# Patient Record
Sex: FEMALE | Race: WHITE | NOT HISPANIC OR LATINO | Employment: OTHER | URBAN - METROPOLITAN AREA
[De-identification: names, ages, dates, MRNs, and addresses within clinical notes are randomized per-mention and may not be internally consistent; named-entity substitution may affect disease eponyms.]

---

## 2023-10-10 ENCOUNTER — HOSPITAL ENCOUNTER (OUTPATIENT)
Facility: CLINIC | Age: 73
Setting detail: OBSERVATION
Discharge: HOME | End: 2023-10-11
Attending: EMERGENCY MEDICINE | Admitting: STUDENT IN AN ORGANIZED HEALTH CARE EDUCATION/TRAINING PROGRAM
Payer: COMMERCIAL

## 2023-10-10 ENCOUNTER — APPOINTMENT (OUTPATIENT)
Dept: GENERAL RADIOLOGY | Facility: CLINIC | Age: 73
End: 2023-10-10
Attending: EMERGENCY MEDICINE
Payer: COMMERCIAL

## 2023-10-10 ENCOUNTER — APPOINTMENT (OUTPATIENT)
Dept: CT IMAGING | Facility: CLINIC | Age: 73
End: 2023-10-10
Attending: EMERGENCY MEDICINE
Payer: COMMERCIAL

## 2023-10-10 DIAGNOSIS — I16.0 HYPERTENSIVE URGENCY: ICD-10-CM

## 2023-10-10 LAB
ALBUMIN SERPL BCG-MCNC: 4.4 G/DL (ref 3.5–5.2)
ALP SERPL-CCNC: 87 U/L (ref 35–104)
ALT SERPL W P-5'-P-CCNC: 22 U/L (ref 0–50)
ANION GAP SERPL CALCULATED.3IONS-SCNC: 13 MMOL/L (ref 7–15)
AST SERPL W P-5'-P-CCNC: 17 U/L (ref 0–45)
ATRIAL RATE - MUSE: 89 BPM
BASO+EOS+MONOS # BLD AUTO: NORMAL 10*3/UL
BASO+EOS+MONOS NFR BLD AUTO: NORMAL %
BASOPHILS # BLD AUTO: 0.1 10E3/UL (ref 0–0.2)
BASOPHILS NFR BLD AUTO: 1 %
BILIRUB SERPL-MCNC: 0.4 MG/DL
BUN SERPL-MCNC: 13.2 MG/DL (ref 8–23)
CALCIUM SERPL-MCNC: 9.5 MG/DL (ref 8.8–10.2)
CHLORIDE SERPL-SCNC: 103 MMOL/L (ref 98–107)
CREAT SERPL-MCNC: 0.58 MG/DL (ref 0.51–0.95)
D DIMER PPP FEU-MCNC: <0.27 UG/ML FEU (ref 0–0.5)
DEPRECATED HCO3 PLAS-SCNC: 23 MMOL/L (ref 22–29)
DIASTOLIC BLOOD PRESSURE - MUSE: NORMAL MMHG
EGFRCR SERPLBLD CKD-EPI 2021: >90 ML/MIN/1.73M2
EOSINOPHIL # BLD AUTO: 0.3 10E3/UL (ref 0–0.7)
EOSINOPHIL NFR BLD AUTO: 3 %
ERYTHROCYTE [DISTWIDTH] IN BLOOD BY AUTOMATED COUNT: 12.6 % (ref 10–15)
GLUCOSE SERPL-MCNC: 101 MG/DL (ref 70–99)
HCT VFR BLD AUTO: 42.8 % (ref 35–47)
HGB BLD-MCNC: 13.8 G/DL (ref 11.7–15.7)
HOLD SPECIMEN: 0
HOLD SPECIMEN: NORMAL
IMM GRANULOCYTES # BLD: 0 10E3/UL
IMM GRANULOCYTES NFR BLD: 0 %
INTERPRETATION ECG - MUSE: NORMAL
LYMPHOCYTES # BLD AUTO: 2.4 10E3/UL (ref 0.8–5.3)
LYMPHOCYTES NFR BLD AUTO: 27 %
MCH RBC QN AUTO: 30 PG (ref 26.5–33)
MCHC RBC AUTO-ENTMCNC: 32.2 G/DL (ref 31.5–36.5)
MCV RBC AUTO: 93 FL (ref 78–100)
MONOCYTES # BLD AUTO: 0.5 10E3/UL (ref 0–1.3)
MONOCYTES NFR BLD AUTO: 6 %
NEUTROPHILS # BLD AUTO: 5.6 10E3/UL (ref 1.6–8.3)
NEUTROPHILS NFR BLD AUTO: 63 %
NRBC # BLD AUTO: 0 10E3/UL
NRBC BLD AUTO-RTO: 0 /100
NT-PROBNP SERPL-MCNC: 40 PG/ML (ref 0–900)
P AXIS - MUSE: 55 DEGREES
PLATELET # BLD AUTO: 254 10E3/UL (ref 150–450)
POTASSIUM SERPL-SCNC: 3.9 MMOL/L (ref 3.4–5.3)
PR INTERVAL - MUSE: 170 MS
PROT SERPL-MCNC: 7.7 G/DL (ref 6.4–8.3)
QRS DURATION - MUSE: 76 MS
QT - MUSE: 338 MS
QTC - MUSE: 411 MS
R AXIS - MUSE: 27 DEGREES
RBC # BLD AUTO: 4.6 10E6/UL (ref 3.8–5.2)
SODIUM SERPL-SCNC: 139 MMOL/L (ref 135–145)
SYSTOLIC BLOOD PRESSURE - MUSE: NORMAL MMHG
T AXIS - MUSE: 22 DEGREES
TROPONIN T SERPL HS-MCNC: 6 NG/L
TROPONIN T SERPL HS-MCNC: 7 NG/L
TSH SERPL DL<=0.005 MIU/L-ACNC: 2.4 UIU/ML (ref 0.3–4.2)
VENTRICULAR RATE- MUSE: 89 BPM
WBC # BLD AUTO: 8.9 10E3/UL (ref 4–11)

## 2023-10-10 PROCEDURE — 84443 ASSAY THYROID STIM HORMONE: CPT | Performed by: EMERGENCY MEDICINE

## 2023-10-10 PROCEDURE — 250N000011 HC RX IP 250 OP 636: Mod: JZ | Performed by: EMERGENCY MEDICINE

## 2023-10-10 PROCEDURE — 71046 X-RAY EXAM CHEST 2 VIEWS: CPT

## 2023-10-10 PROCEDURE — 96361 HYDRATE IV INFUSION ADD-ON: CPT

## 2023-10-10 PROCEDURE — 36415 COLL VENOUS BLD VENIPUNCTURE: CPT | Performed by: EMERGENCY MEDICINE

## 2023-10-10 PROCEDURE — 99285 EMERGENCY DEPT VISIT HI MDM: CPT | Mod: 25

## 2023-10-10 PROCEDURE — 99222 1ST HOSP IP/OBS MODERATE 55: CPT | Mod: AI | Performed by: STUDENT IN AN ORGANIZED HEALTH CARE EDUCATION/TRAINING PROGRAM

## 2023-10-10 PROCEDURE — 85379 FIBRIN DEGRADATION QUANT: CPT | Performed by: EMERGENCY MEDICINE

## 2023-10-10 PROCEDURE — 70450 CT HEAD/BRAIN W/O DYE: CPT

## 2023-10-10 PROCEDURE — 84484 ASSAY OF TROPONIN QUANT: CPT | Performed by: EMERGENCY MEDICINE

## 2023-10-10 PROCEDURE — 93005 ELECTROCARDIOGRAM TRACING: CPT

## 2023-10-10 PROCEDURE — G0378 HOSPITAL OBSERVATION PER HR: HCPCS

## 2023-10-10 PROCEDURE — 82310 ASSAY OF CALCIUM: CPT | Performed by: EMERGENCY MEDICINE

## 2023-10-10 PROCEDURE — 84484 ASSAY OF TROPONIN QUANT: CPT | Mod: 91 | Performed by: EMERGENCY MEDICINE

## 2023-10-10 PROCEDURE — 86618 LYME DISEASE ANTIBODY: CPT | Performed by: EMERGENCY MEDICINE

## 2023-10-10 PROCEDURE — 96374 THER/PROPH/DIAG INJ IV PUSH: CPT

## 2023-10-10 PROCEDURE — 83880 ASSAY OF NATRIURETIC PEPTIDE: CPT | Performed by: EMERGENCY MEDICINE

## 2023-10-10 PROCEDURE — 96376 TX/PRO/DX INJ SAME DRUG ADON: CPT

## 2023-10-10 PROCEDURE — 250N000013 HC RX MED GY IP 250 OP 250 PS 637: Performed by: STUDENT IN AN ORGANIZED HEALTH CARE EDUCATION/TRAINING PROGRAM

## 2023-10-10 PROCEDURE — 85025 COMPLETE CBC W/AUTO DIFF WBC: CPT | Performed by: EMERGENCY MEDICINE

## 2023-10-10 PROCEDURE — 258N000003 HC RX IP 258 OP 636: Performed by: EMERGENCY MEDICINE

## 2023-10-10 RX ORDER — MULTIVIT WITH MINERALS/LUTEIN
2000 TABLET ORAL DAILY
COMMUNITY

## 2023-10-10 RX ORDER — FLUTICASONE PROPIONATE AND SALMETEROL 100; 50 UG/1; UG/1
1 POWDER RESPIRATORY (INHALATION) EVERY 12 HOURS
COMMUNITY

## 2023-10-10 RX ORDER — ONDANSETRON 2 MG/ML
4 INJECTION INTRAMUSCULAR; INTRAVENOUS EVERY 6 HOURS PRN
Status: DISCONTINUED | OUTPATIENT
Start: 2023-10-10 | End: 2023-10-11 | Stop reason: HOSPADM

## 2023-10-10 RX ORDER — LANOLIN ALCOHOL/MO/W.PET/CERES
3 CREAM (GRAM) TOPICAL
Status: DISCONTINUED | OUTPATIENT
Start: 2023-10-10 | End: 2023-10-11 | Stop reason: HOSPADM

## 2023-10-10 RX ORDER — ONDANSETRON 4 MG/1
4 TABLET, ORALLY DISINTEGRATING ORAL EVERY 6 HOURS PRN
Status: DISCONTINUED | OUTPATIENT
Start: 2023-10-10 | End: 2023-10-11 | Stop reason: HOSPADM

## 2023-10-10 RX ORDER — HYDRALAZINE HYDROCHLORIDE 20 MG/ML
10 INJECTION INTRAMUSCULAR; INTRAVENOUS ONCE
Status: COMPLETED | OUTPATIENT
Start: 2023-10-10 | End: 2023-10-10

## 2023-10-10 RX ORDER — AMLODIPINE BESYLATE 5 MG/1
5 TABLET ORAL DAILY
Status: DISCONTINUED | OUTPATIENT
Start: 2023-10-10 | End: 2023-10-11

## 2023-10-10 RX ADMIN — SODIUM CHLORIDE 1000 ML: 9 INJECTION, SOLUTION INTRAVENOUS at 17:19

## 2023-10-10 RX ADMIN — AMLODIPINE BESYLATE 5 MG: 5 TABLET ORAL at 22:16

## 2023-10-10 RX ADMIN — HYDRALAZINE HYDROCHLORIDE 10 MG: 20 INJECTION INTRAMUSCULAR; INTRAVENOUS at 20:43

## 2023-10-10 RX ADMIN — HYDRALAZINE HYDROCHLORIDE 10 MG: 20 INJECTION INTRAMUSCULAR; INTRAVENOUS at 20:02

## 2023-10-10 RX ADMIN — Medication 3 MG: at 22:16

## 2023-10-10 ASSESSMENT — ACTIVITIES OF DAILY LIVING (ADL)
ADLS_ACUITY_SCORE: 35

## 2023-10-10 NOTE — ED PROVIDER NOTES
"  History     Chief Complaint:  Lightheadedness, head throbbing, dizziness     HPI   Alessandra Barajas is a 73 year old female with a history of asthma, hyperlipidemia, osteopenia, and migraine who presents with dizziness, mild throbbing in the head, lightheadedness, and the feeling of passing out which started today when she bent over trying to pick something up. She went to the hair salon and the fumes there seemed to make her symptoms worse. She states she also experienced months of shortness of breath and fatigue since her sinus infection on 9/1 which she was prescribed 2 antibiotics for. During and after taking her medication, she reports central chest pain. In the last month, she has been experiencing low energy and sleeping 11 hours a day. She went to Urgent Care in [place] where she had a normal x-ray. Currently, she just feels tired and a burning, throbbing sensation in the head. Denies room spinning. Reports recent vaginal yeast infection. Denies history of lyme disease or thyroid issues.    Independent Historian:   None - Patient Only    Review of External Notes:   9/30/2023 office visit for acute sinusitis at [facility] urgent care     Medications:    Voltaren   Albuterol   Wixela inhub   Methylprednisolone   Flexeril   Augmentin     Past Medical History:    Asthma   Allergic rhinitis   Benign neoplasm of colon   Osteopenia   Chronic constipation   Migraine   Hyperlipidemia     Past Surgical History:    Colonoscopy   Mastectomy   Appendectomy   Sinus surgery   Mastoidectomy   Breast biopsy    Physical Exam      Physical Exam    Patient Vitals for the past 24 hrs:   BP Temp Pulse Resp SpO2 Height Weight   10/10/23 1800 (!) 194/95 -- 80 14 98 % -- --   10/10/23 1720 (!) 185/107 -- 85 30 97 % -- --   10/10/23 1700 (!) 206/120 -- 87 -- 100 % -- --   10/10/23 1319 (!) 221/105 97.1  F (36.2  C) 99 14 97 % 1.676 m (5' 6\") 88.5 kg (195 lb)     General: Alert and Interactive.   Head: No signs of " trauma.   Mouth/Throat: Oropharynx is clear and moist.   Eyes: Conjunctivae are normal. Pupils are equal, round, and reactive to light.   Neck: Normal range of motion. No nuchal rigidity.   CV: Normal rate and regular rhythm.    Resp: Effort normal and breath sounds normal. No respiratory distress.   GI: Soft. There is no tenderness or guarding.   MSK: Normal range of motion. no edema.   Neuro: The patient is alert and oriented to person, place, and time.  PERRLA, EOMI, strength in upper/lower extremities normal and symmetrical.   Sensation normal. Speech normal.  GCS eye subscore is 4. GCS verbal subscore is 5. GCS motor subscore is 6.   Skin: Skin is warm and dry. No rash noted.   Psych: normal mood and affect. behavior is normal.      Emergency Department Course   ECG  ECG results from 10/10/23   EKG 12-lead, tracing only     Value    Systolic Blood Pressure     Diastolic Blood Pressure     Ventricular Rate 89    Atrial Rate 89    VA Interval 170    QRS Duration 76        QTc 411    P Axis 55    R AXIS 27    T Axis 22    Interpretation ECG      Sinus rhythm  Possible Left atrial enlargement  Septal infarct , age undetermined  Abnormal ECG  No previous ECGs available  Taken 1337, Read 1800      Imaging:  Head CT w/o contrast   Final Result   IMPRESSION:   1.  No acute transcortical infarct, intracranial hemorrhage, or mass effect.            XR Chest 2 Views   Final Result   IMPRESSION: Heart size and pulmonary vascularity normal. Linear strand of fibrosis or atelectasis left base, lungs otherwise clear. A few gas-distended bowel loops left upper quadrant.         Laboratory:  Labs Ordered and Resulted from Time of ED Arrival to Time of ED Departure   COMPREHENSIVE METABOLIC PANEL - Abnormal       Result Value    Sodium 139      Potassium 3.9      Carbon Dioxide (CO2) 23      Anion Gap 13      Urea Nitrogen 13.2      Creatinine 0.58      GFR Estimate >90      Calcium 9.5      Chloride 103      Glucose 101  (*)     Alkaline Phosphatase 87      AST 17      ALT 22      Protein Total 7.7      Albumin 4.4      Bilirubin Total 0.4     D DIMER QUANTITATIVE - Normal    D-Dimer Quantitative <0.27     TROPONIN T, HIGH SENSITIVITY - Normal    Troponin T, High Sensitivity 6     NT PROBNP INPATIENT - Normal    N terminal Pro BNP Inpatient 40     TROPONIN T, HIGH SENSITIVITY - Normal    Troponin T, High Sensitivity 7     TSH WITH FREE T4 REFLEX - Normal    TSH 2.40     CBC WITH PLATELETS AND DIFFERENTIAL    WBC Count 8.9      RBC Count 4.60      Hemoglobin 13.8      Hematocrit 42.8      MCV 93      MCH 30.0      MCHC 32.2      RDW 12.6      Platelet Count 254      % Neutrophils 63      % Lymphocytes 27      % Monocytes 6      Mids % (Monos, Eos, Basos)        % Eosinophils 3      % Basophils 1      % Immature Granulocytes 0      NRBCs per 100 WBC 0      Absolute Neutrophils 5.6      Absolute Lymphocytes 2.4      Absolute Monocytes 0.5      Mids Abs (Monos, Eos, Basos)        Absolute Eosinophils 0.3      Absolute Basophils 0.1      Absolute Immature Granulocytes 0.0      Absolute NRBCs 0.0     LYME DISEASE TOTAL ABS BLD WITH REFLEX TO CONFIRM CLIA        Emergency Department Course & Assessments:    Interventions:  Medications   melatonin tablet 3 mg (3 mg Oral $Given 10/10/23 2216)   ondansetron (ZOFRAN ODT) ODT tab 4 mg (has no administration in time range)     Or   ondansetron (ZOFRAN) injection 4 mg (has no administration in time range)   sodium chloride 0.9 % infusion ( Intravenous $New Bag 10/11/23 0038)   amLODIPine (NORVASC) tablet 2.5 mg (has no administration in time range)   sodium chloride 0.9% BOLUS 500 mL (500 mLs Intravenous $New Bag 10/11/23 0035)   fluticasone (FLONASE) 50 MCG/ACT spray 1 spray (has no administration in time range)   sodium chloride 0.9% BOLUS 1,000 mL (0 mLs Intravenous Stopped 10/10/23 1835)   hydrALAZINE (APRESOLINE) injection 10 mg (10 mg Intravenous $Given 10/10/23 2002)   hydrALAZINE  "(APRESOLINE) injection 10 mg (10 mg Intravenous $Given 10/10/23 2043)        Assessments:  1726 I obtained the history and examined the patient as detailed above.   2033 I rechecked patient. Blood pressure is still elevated, so she will be admitted for hypertensive emergency.    Independent Interpretation (X-rays, CTs, rhythm strip):  Chest x-ray shows no pneumothorax    Consultations/Discussion of Management or Tests:  2117 I spoke with the hospitalist, Dr. Medeiros, about patient admission.   2138 I spoke with the hospitalist, Dr. Medeiros, about patient presentation.     Social Determinants of Health affecting care:   Stress/Adjustment Disorders    Disposition:  The patient was admitted to the hospital under the care of Dr. Medeiros.     Impression & Plan      Medical Decision Making:  Alessandra Barajas is a 73 year old female who presents for evaluation of elevated blood pressure and multiple additional symptoms including lightheadedness and a \"funny feeling in her head\".  There is history of hypertension in the past.  The workup here is negative and the patient does not have any clinical, laboratory, ecg or historical signs of end-organ dysfunction, except for the ongoing headache.  This is consistent with hypertensive urgency.    The patient will be admitted to the hospital for further evaluation and treatment    Diagnosis:    ICD-10-CM    1. Hypertensive urgency  I16.0               Scribe Disclosure:  I, Matti Ana Rosa, am serving as a scribe at 8:05 PM on 10/10/2023 to document services personally performed by Masood Coats MD based on my observations and the provider's statements to me.   10/10/2023   Masood Coats MD Joing, Todd Roger, MD  10/11/23 0048       Masood Coats MD  10/11/23 0049    "

## 2023-10-10 NOTE — ED TRIAGE NOTES
"Patient reports 1 month of shortness of breath. Comes in today because she feels this is worse, she is lightheaded, and \"feels a lump in her throat.\"      Triage Assessment       Row Name 10/10/23 8743       Triage Assessment (Adult)    Airway WDL WDL       Respiratory WDL    Respiratory WDL X;all    Rhythm/Pattern, Respiratory shortness of breath       Cognitive/Neuro/Behavioral WDL    Cognitive/Neuro/Behavioral WDL WDL                    "

## 2023-10-10 NOTE — ED NOTES
"Tele-PIT/Intake Evaluation      Video-Visit Details    Type of service:  Video Visit    Video Start Time (time video started): 1:24 PM  Video End Time (time video stopped): 1:27 PM   Originating Location (pt. Location):  [facility]  Distant Location (provider location):  [place]  Mode of Communication:  Video Conference via EPIS  Patient verbally consented to [facility] televisit.    History:  Patient presents with dizziness, lightheaded and shortness of breath.  Patient with one month of sinus infection.  Felt couldn't get enough air today.  Took amoxicillin for 10 days and then it returned and patient took Augmentin which she finished yesterday.  Seen at urgent care for these infection.  No cough.  No recent travel.  No fever.  Small pain on right side of sternum throughout the \"sinus infection\" but no change in that today.  Patient felt her blood pressure increase today she started feeling very flushed in the face.  She does not use decongestants since early in the sinus infection    Exam:  General:  Alert, interactive  Cardiovascular:  Well perfused  Lungs:  No respiratory distress, no accessory muscle use  Neuro:  Moving all 4 extremities  Skin:  Warm, dry  Psych:  Normal affect    Patient Vitals for the past 24 hrs:   BP Temp Pulse Resp SpO2 Height Weight   10/10/23 1319 (!) 221/105 97.1  F (36.2  C) 99 14 97 % 1.676 m (5' 6\") 88.5 kg (195 lb)       Appropriate interventions for symptom management were initiated if applicable.  Appropriate diagnostic tests were initiated if indicated.    Important information for subsequent clinician:  Patient with dizziness, lightheadedness and shortness of breath that started today.  She is been under the care of urgent care for sinus infection with course of amoxicillin followed by a course of Augmentin.  These are new symptoms today though she feels that due to the congestion she is having trouble getting enough air as well as feeling dizziness " and lightheadedness.  We will start EKG, chest x-ray, line and labs    I briefly evaluated the patient and developed an initial plan of care. I discussed this plan and explained that this brief interaction does not constitute a full evaluation. Patient/family understands that they should wait to be fully evaluated and discuss any test results with another clinician prior to leaving the hospital.       Tran Reddy MD  10/10/23 9338

## 2023-10-11 VITALS
TEMPERATURE: 97.9 F | BODY MASS INDEX: 30.38 KG/M2 | WEIGHT: 193.56 LBS | DIASTOLIC BLOOD PRESSURE: 96 MMHG | HEART RATE: 83 BPM | SYSTOLIC BLOOD PRESSURE: 170 MMHG | RESPIRATION RATE: 16 BRPM | HEIGHT: 67 IN | OXYGEN SATURATION: 98 %

## 2023-10-11 LAB
ALBUMIN SERPL BCG-MCNC: 4.1 G/DL (ref 3.5–5.2)
ALP SERPL-CCNC: 80 U/L (ref 35–104)
ALT SERPL W P-5'-P-CCNC: 19 U/L (ref 0–50)
ANION GAP SERPL CALCULATED.3IONS-SCNC: 12 MMOL/L (ref 7–15)
AST SERPL W P-5'-P-CCNC: 21 U/L (ref 0–45)
B BURGDOR IGG+IGM SER QL: 0.34
BILIRUB DIRECT SERPL-MCNC: <0.2 MG/DL (ref 0–0.3)
BILIRUB SERPL-MCNC: 0.5 MG/DL
BUN SERPL-MCNC: 7.2 MG/DL (ref 8–23)
CALCIUM SERPL-MCNC: 9.2 MG/DL (ref 8.8–10.2)
CHLORIDE SERPL-SCNC: 106 MMOL/L (ref 98–107)
CREAT SERPL-MCNC: 0.49 MG/DL (ref 0.51–0.95)
DEPRECATED HCO3 PLAS-SCNC: 23 MMOL/L (ref 22–29)
EGFRCR SERPLBLD CKD-EPI 2021: >90 ML/MIN/1.73M2
ERYTHROCYTE [DISTWIDTH] IN BLOOD BY AUTOMATED COUNT: 12.9 % (ref 10–15)
GLUCOSE SERPL-MCNC: 108 MG/DL (ref 70–99)
HCT VFR BLD AUTO: 39.6 % (ref 35–47)
HGB BLD-MCNC: 13 G/DL (ref 11.7–15.7)
MCH RBC QN AUTO: 30.3 PG (ref 26.5–33)
MCHC RBC AUTO-ENTMCNC: 32.8 G/DL (ref 31.5–36.5)
MCV RBC AUTO: 92 FL (ref 78–100)
PLATELET # BLD AUTO: 246 10E3/UL (ref 150–450)
POTASSIUM SERPL-SCNC: 4.1 MMOL/L (ref 3.4–5.3)
PROT SERPL-MCNC: 7.1 G/DL (ref 6.4–8.3)
RBC # BLD AUTO: 4.29 10E6/UL (ref 3.8–5.2)
SODIUM SERPL-SCNC: 141 MMOL/L (ref 135–145)
WBC # BLD AUTO: 8.7 10E3/UL (ref 4–11)

## 2023-10-11 PROCEDURE — 85027 COMPLETE CBC AUTOMATED: CPT | Performed by: STUDENT IN AN ORGANIZED HEALTH CARE EDUCATION/TRAINING PROGRAM

## 2023-10-11 PROCEDURE — 96361 HYDRATE IV INFUSION ADD-ON: CPT

## 2023-10-11 PROCEDURE — 258N000003 HC RX IP 258 OP 636: Performed by: STUDENT IN AN ORGANIZED HEALTH CARE EDUCATION/TRAINING PROGRAM

## 2023-10-11 PROCEDURE — 82248 BILIRUBIN DIRECT: CPT | Performed by: STUDENT IN AN ORGANIZED HEALTH CARE EDUCATION/TRAINING PROGRAM

## 2023-10-11 PROCEDURE — 250N000013 HC RX MED GY IP 250 OP 250 PS 637: Performed by: INTERNAL MEDICINE

## 2023-10-11 PROCEDURE — 80053 COMPREHEN METABOLIC PANEL: CPT | Performed by: STUDENT IN AN ORGANIZED HEALTH CARE EDUCATION/TRAINING PROGRAM

## 2023-10-11 PROCEDURE — 36415 COLL VENOUS BLD VENIPUNCTURE: CPT | Performed by: STUDENT IN AN ORGANIZED HEALTH CARE EDUCATION/TRAINING PROGRAM

## 2023-10-11 PROCEDURE — 99239 HOSP IP/OBS DSCHRG MGMT >30: CPT | Performed by: INTERNAL MEDICINE

## 2023-10-11 PROCEDURE — G0378 HOSPITAL OBSERVATION PER HR: HCPCS

## 2023-10-11 PROCEDURE — 250N000013 HC RX MED GY IP 250 OP 250 PS 637: Performed by: STUDENT IN AN ORGANIZED HEALTH CARE EDUCATION/TRAINING PROGRAM

## 2023-10-11 RX ORDER — AMLODIPINE BESYLATE 2.5 MG/1
2.5 TABLET ORAL DAILY
Status: DISCONTINUED | OUTPATIENT
Start: 2023-10-11 | End: 2023-10-11

## 2023-10-11 RX ORDER — FLUTICASONE PROPIONATE 50 MCG
1 SPRAY, SUSPENSION (ML) NASAL DAILY
Status: DISCONTINUED | OUTPATIENT
Start: 2023-10-11 | End: 2023-10-11 | Stop reason: HOSPADM

## 2023-10-11 RX ORDER — HYDROCHLOROTHIAZIDE 12.5 MG/1
12.5 TABLET ORAL DAILY
Status: DISCONTINUED | OUTPATIENT
Start: 2023-10-11 | End: 2023-10-11

## 2023-10-11 RX ORDER — ACETAMINOPHEN 325 MG/1
650 TABLET ORAL EVERY 4 HOURS PRN
Status: DISCONTINUED | OUTPATIENT
Start: 2023-10-11 | End: 2023-10-11 | Stop reason: HOSPADM

## 2023-10-11 RX ORDER — AMLODIPINE BESYLATE 5 MG/1
5 TABLET ORAL DAILY
Status: DISCONTINUED | OUTPATIENT
Start: 2023-10-11 | End: 2023-10-11 | Stop reason: HOSPADM

## 2023-10-11 RX ORDER — AMLODIPINE BESYLATE 5 MG/1
5 TABLET ORAL DAILY
Status: DISCONTINUED | OUTPATIENT
Start: 2023-10-11 | End: 2023-10-11

## 2023-10-11 RX ORDER — ACETAMINOPHEN 650 MG/1
650 SUPPOSITORY RECTAL EVERY 4 HOURS PRN
Status: DISCONTINUED | OUTPATIENT
Start: 2023-10-11 | End: 2023-10-11 | Stop reason: HOSPADM

## 2023-10-11 RX ORDER — SODIUM CHLORIDE 9 MG/ML
INJECTION, SOLUTION INTRAVENOUS CONTINUOUS
Status: DISCONTINUED | OUTPATIENT
Start: 2023-10-11 | End: 2023-10-11

## 2023-10-11 RX ORDER — AMLODIPINE BESYLATE 5 MG/1
5 TABLET ORAL DAILY
Qty: 30 TABLET | Refills: 0 | Status: SHIPPED | OUTPATIENT
Start: 2023-10-11 | End: 2023-11-10

## 2023-10-11 RX ADMIN — ACETAMINOPHEN 650 MG: 325 TABLET, FILM COATED ORAL at 04:25

## 2023-10-11 RX ADMIN — FLUTICASONE PROPIONATE 1 SPRAY: 50 SPRAY, METERED NASAL at 09:17

## 2023-10-11 RX ADMIN — SODIUM CHLORIDE: 9 INJECTION, SOLUTION INTRAVENOUS at 00:38

## 2023-10-11 RX ADMIN — SODIUM CHLORIDE 500 ML: 9 INJECTION, SOLUTION INTRAVENOUS at 00:35

## 2023-10-11 RX ADMIN — AMLODIPINE BESYLATE 5 MG: 5 TABLET ORAL at 14:05

## 2023-10-11 ASSESSMENT — ACTIVITIES OF DAILY LIVING (ADL)
ADLS_ACUITY_SCORE: 35
ADLS_ACUITY_SCORE: 31

## 2023-10-11 NOTE — PROGRESS NOTES
RECEIVING UNIT ED HANDOFF REVIEW    ED Nurse Handoff Report was reviewed by: Cesilia Rueda RN on October 11, 2023 at 12:35 AM

## 2023-10-11 NOTE — PROGRESS NOTES
Bp 155/87 Dr notified and okay for pt to discontinue. Pt a/o up sba eatting norvasc given. Pt dced with appiontment belongings and meds. With

## 2023-10-11 NOTE — ED NOTES
[facility]  ED Nurse Handoff Report    ED Chief complaint: Shortness of Breath      ED Diagnosis:   Final diagnoses:   Hypertensive urgency       Code Status: Full Code    Allergies:   Allergies   Allergen Reactions    Azithromycin GI Disturbance    Sulfa Antibiotics GI Disturbance       Patient Story: Alessandra Barajas is a 73 year old female with a history of asthma, hyperlipidemia, osteopenia, and migraine who presents with dizziness, mild throbbing in the head, lightheadedness, and the feeling of passing out which started today when she bent over trying to pick something up. She went to the hair salon and the fumes there seemed to make her symptoms worse. She states she also experienced months of shortness of breath and fatigue since her sinus infection on 9/1 which she was prescribed 2 antibiotics for. During and after taking her medication, she reports central chest pain. In the last month, she has been experiencing low energy and sleeping 11 hours a day. She went to Urgent Care in [place] where she had a normal x-ray. Currently, she just feels tired and a burning, throbbing sensation in the head. Denies room spinning. Reports recent vaginal yeast infection. Denies history of lyme disease or thyroid issues.   Focused Assessment:  Pt. Is alert and orient. Times 3, having left parietal-occipital headache, denies CP. Pt is able to ambulate to BR. Having intermittant nausea.     Treatments and/or interventions provided: Hydralazine 10 mg IVP times 2 doses. NaCl bolus times one liter.   Patient's response to treatments and/or interventions: Resting in room.     To be done/followed up on inpatient unit:  Nothing    Does this patient have any cognitive concerns?: None    Activity level - Baseline/Home:  Independent  Activity Level - Current:   Independent    Patient's Preferred language: English   Needed?: No    Isolation: None  Infection: Not Applicable  Patient tested for COVID 19 prior  to admission: NO  Bariatric?: No    Vital Signs:   Vitals:    10/10/23 1800 10/10/23 2032 10/10/23 2119 10/10/23 2130   BP: (!) 194/95 (!) 208/99 (!) 191/83 (!) 176/83   Pulse: 80 108 106 102   Resp: 14 18 18 20   Temp:       SpO2: 98% 97% 95% 96%   Weight:       Height:           Cardiac Rhythm:     Was the PSS-3 completed:   Yes  What interventions are required if any?               Family Comments:  has gone home the night  OBS brochure/video discussed/provided to patient/family: Yes              Name of person given brochure if not patient:                Relationship to patient:      For the majority of the shift this patient's behavior was Green.   Behavioral interventions performed were None.    ED NURSE PHONE NUMBER: [phone]

## 2023-10-11 NOTE — DISCHARGE SUMMARY
"[facility]  Hospitalist Discharge Summary      Date of Admission:  10/10/2023  Date of Discharge:  10/11/2023  Discharging Provider: Jama Prado MD  Discharge Service: Hospitalist Service    Discharge Diagnoses     #Hypertensive urgency  #Chest pain  # Asthma      Clinically Significant Risk Factors     # Obesity: Estimated body mass index is 30.32 kg/m  as calculated from the following:    Height as of this encounter: 1.702 m (5' 7\").    Weight as of this encounter: 87.8 kg (193 lb 9 oz).       Follow-ups Needed After Discharge   Follow-up Appointments     Follow-up and recommended labs and tests       Follow up with primary care provider, Physician No Ref-Primary, within 7   days for hospital follow- up.      An appointment has been scheduled as follows:  [facility], [address]  [phone]  Dr. Alice Nicholas  Monday October 16th at 2:05 pm  Please bring medication list or your hospital after visit summary.        {  Unresulted Labs Ordered in the Past 30 Days of this Admission       No orders found for last 31 day(s).          Discharge Disposition   Discharged to home  Condition at discharge: Stable    Hospital Course   Alessandra Barajas is a 73 year old female admitted on 10/10/2023. She has of History of Asthma , HTN coming to the ER with dizziness     #Hypertensive urgency  -CT scan did not show any infarct  -Patient was given  IV hydralazine x2 in the ER  -EKG shows no ST elevation and troponin facet has been negative  Patient was admitted in observation blood pressure is improved.  She was started on amlodipine and was advised to maintain a blood pressure diary to be followed by PMD.      #Chest pain  -Chest pain has subsided in the ED has been having mild right-sided chest pain since the time of her sinus infection  -EKG no ST elevations.  Troponins were trended and remained negative    # Asthma  Not in exacerbation  Continue home inhalors    " "  Consultations This Hospital Stay   None    Code Status   Full Code    Time Spent on this Encounter   I, Jama Prado MD, personally saw the patient today and spent greater than 30 minutes discharging this patient.       Jama Prado MD  [facility] EXTENDED RECOVERY AND SHORT STAY  [address] [address]  Phone: [phone]  ______________________________________________________________________    Physical Exam   BP (!) 146/91 (BP Location: Right arm)   Pulse 82   Temp 97.5  F (36.4  C) (Oral)   Resp 16   Ht 1.702 m (5' 7\")   Wt 87.8 kg (193 lb 9 oz)   SpO2 96%   BMI 30.32 kg/m    Gen- pleasant lying in bed  Neck- supple  CVS- I+II+ no m/r/g  RS- CTAB  Abdo- soft, no tenderness. No g/r/r   Ext- no edema   CNS- no gross focal deficit       Primary Care Physician   Physician No Ref-Primary    Discharge Orders      Follow-up and recommended labs and tests     Follow up with primary care provider, Physician No Ref-Primary, within 7 days for hospital follow- up.      An appointment has been scheduled as follows:  [facility], [facility] [place]  [phone]  Dr. Alice Nicholas  Monday October 16th at 2:05 pm  Please bring medication list or your hospital after visit summary.     Reason for your hospital stay    Alessandra Barajas is a 73 year old female admitted on 10/10/2023. She has of History of Asthma , HTN coming to the ER with dizziness   #Hypertensive urgency  -CT scan did not show any infarct  -Patient was given  IV hydralazine x2 in the ER  -EKG shows no ST elevation and troponin facet has been negative  -Started on amlodipine for better control of blood pressure.     Activity    Your activity upon discharge: activity as tolerated     Monitor and record    blood pressure daily and report levels above 180 to your PMD    If persistently more than 170 systolic, can increase amlodipine to 10 mg/day until further review by your PMD     Diet    " Follow this diet upon discharge: Orders Placed This Encounter      Regular Diet Adult       Significant Results and Procedures   Results for orders placed or performed during the hospital encounter of 10/10/23   XR Chest 2 Views    Narrative    EXAM: XR CHEST 2 VIEWS  LOCATION: [facility]  DATE: 10/10/2023    INDICATION: shortness of breath  COMPARISON: None.      Impression    IMPRESSION: Heart size and pulmonary vascularity normal. Linear strand of fibrosis or atelectasis left base, lungs otherwise clear. A few gas-distended bowel loops left upper quadrant.   Head CT w/o contrast    Narrative    EXAM: CT HEAD W/O CONTRAST  LOCATION: [facility]  DATE/TIME: 10/10/2023 6:33 PM CDT    INDICATION: HTN, HA  COMPARISON: None.  TECHNIQUE: Routine CT Head without IV contrast. Multiplanar reformats. Dose reduction techniques were used.    FINDINGS:    INTRACRANIAL CONTENTS: Gray-white matter differentiation is maintained. No hemorrhage or extraaxial collection. No mass effect. Subarachnoid cisterns are patent. Scattered white matter hypodensities are, while nonspecific, most compatible with chronic   microvascular ischemic changes. Mild generalized volume loss. No hydrocephalus.    VISUALIZED ORBITS/SINUSES/MASTOIDS: No visible intraorbital abnormality. Paranasal sinuses are clear. No middle ear or mastoid effusion.    BONES/SOFT TISSUES: No acute abnormality.      Impression    IMPRESSION:  1.  No acute transcortical infarct, intracranial hemorrhage, or mass effect.           Discharge Medications   Current Discharge Medication List        START taking these medications    Details   amLODIPine (NORVASC) 5 MG tablet Take 1 tablet (5 mg) by mouth daily for 30 days  Qty: 30 tablet, Refills: 0    Associated Diagnoses: Hypertensive urgency           CONTINUE these medications which have NOT CHANGED    Details   CHOLECALCIFEROL PO Take 1 tablet by mouth daily       Docosahexaenoic Acid (DHA) 200 MG capsule Take 200 mg by mouth daily      fluticasone-salmeterol (ADVAIR) 100-50 MCG/ACT inhaler Inhale 1 puff into the lungs every 12 hours      Probiotic Product (PROBIOTIC DAILY PO) Take 1 capsule by mouth daily      vitamin C (ASCORBIC ACID) 1000 MG TABS Take 2,000 mg by mouth daily           Allergies   Allergies   Allergen Reactions    Azithromycin GI Disturbance    Sulfa Antibiotics GI Disturbance

## 2023-10-11 NOTE — H&P
"[facility]    History and Physical - Hospitalist Service       Date of Admission:  10/10/2023    Assessment & Plan      Alessandra Barajas is a 73 year old female admitted on 10/10/2023. She has of History of Asthma , HTN coming to the ER with dizziness      #Hypertensive urgency  -CT scan did not show any infarct  -Patient was given  IV hydralazine x2 in the ER  -EKG shows no ST elevation and troponin facet has been negative  - will avoid any further IV medications and will  use oral medications as she does not have any signs of endorgan damage  -Admit as observation  -Trend troponins      #Chest pain  -Chest pain has subsided in the ED has been having mild right-sided chest pain since the time of her sinus infection  -EKG no ST elevations  -Trend troponins x3        # Asthma  Not in exacerbation  Continue home inhalors       Diet: Regular Diet Adult    DVT Prophylaxis: Low Risk/Ambulatory with no VTE prophylaxis indicated and Pneumatic Compression Devices  Tam Catheter: Not present  Lines: None     Cardiac Monitoring: None  Code Status: Full Code      Clinically Significant Risk Factors Present on Admission                  # Hypertension: Noted on problem list      # Obesity: Estimated body mass index is 31.47 kg/m  as calculated from the following:    Height as of this encounter: 1.676 m (5' 6\").    Weight as of this encounter: 88.5 kg (195 lb).              Disposition Plan      Expected Discharge Date: 10/11/2023                  Fany Medeiros MD  Hospitalist Service  [facility]  Securely message with [employer] (more info)  Text page via [employer] Paging/Directory     ______________________________________________________________________    Chief Complaint   Dizziness     History is obtained from the patient    History of Present Illness   Alessandra Barajas is a 73 year old female who   Patient has a history of asthma.  Patient was in her usual state of " health this morning she noticed dizziness when she was bending down.  The dizziness mildly improved.  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  She was recently treated for sinus infection and she completed Augmentin.  Since the last few days patient was also having some mild right-sided chest pain.  She currently does not have any chest pain.  She felt mildly nauseous.  She also states that she was hungry and she would like to eat.       Past Medical History    No past medical history on file.    Past Surgical History   No past surgical history on file.    Prior to Admission Medications   Prior to Admission Medications   Prescriptions Last Dose Informant Patient Reported? Taking?   CHOLECALCIFEROL PO   Yes Yes   Sig: Take 1 tablet by mouth daily   Docosahexaenoic Acid (DHA) 200 MG capsule   Yes Yes   Sig: Take 200 mg by mouth daily   Probiotic Product (PROBIOTIC DAILY PO)   Yes Yes   Sig: Take 1 capsule by mouth daily   fluticasone-salmeterol (ADVAIR) 100-50 MCG/ACT inhaler   Yes Yes   Sig: Inhale 1 puff into the lungs every 12 hours   vitamin C (ASCORBIC ACID) 1000 MG TABS   Yes Yes   Sig: Take 2,000 mg by mouth daily      Facility-Administered Medications: None           Physical Exam   Vital Signs: Temp: 97.1  F (36.2  C)   BP: (!) 176/83 Pulse: 102   Resp: 20 SpO2: 96 % O2 Device: None (Room air)    Weight: 195 lbs 0 oz    Physical Exam  Cardiovascular:      Rate and Rhythm: Normal rate and regular rhythm.      Heart sounds: Normal heart sounds.   Pulmonary:      Effort: Pulmonary effort is normal. No respiratory distress.      Breath sounds: No wheezing.   Abdominal:      General: There is no distension.      Palpations: Abdomen is soft.      Tenderness: There is no abdominal tenderness.   Musculoskeletal:      Right lower leg: No edema.      Left lower leg: No edema.   Neurological:      General: No focal deficit present.      Mental Status: She is  alert.      Cranial Nerves: No cranial nerve deficit.      Motor: No weakness.          Medical Decision Making             Data     I have personally reviewed the following data over the past 24 hrs:    8.9  \   13.8   / 254     139 103 13.2 /  101 (H)   3.9 23 0.58 \     ALT: 22 AST: 17 AP: 87 TBILI: 0.4   ALB: 4.4 TOT PROTEIN: 7.7 LIPASE: N/A     Trop: 7 BNP: 40     TSH: 2.40 T4: N/A A1C: N/A     INR:  N/A PTT:  N/A   D-dimer:  <0.27 Fibrinogen:  N/A       Imaging results reviewed over the past 24 hrs:   Recent Results (from the past 24 hour(s))   XR Chest 2 Views    Narrative    EXAM: XR CHEST 2 VIEWS  LOCATION: [facility]  DATE: 10/10/2023    INDICATION: shortness of breath  COMPARISON: None.      Impression    IMPRESSION: Heart size and pulmonary vascularity normal. Linear strand of fibrosis or atelectasis left base, lungs otherwise clear. A few gas-distended bowel loops left upper quadrant.   Head CT w/o contrast    Narrative    EXAM: CT HEAD W/O CONTRAST  LOCATION: [facility]  DATE/TIME: 10/10/2023 6:33 PM CDT    INDICATION: HTN, HA  COMPARISON: None.  TECHNIQUE: Routine CT Head without IV contrast. Multiplanar reformats. Dose reduction techniques were used.    FINDINGS:    INTRACRANIAL CONTENTS: Gray-white matter differentiation is maintained. No hemorrhage or extraaxial collection. No mass effect. Subarachnoid cisterns are patent. Scattered white matter hypodensities are, while nonspecific, most compatible with chronic   microvascular ischemic changes. Mild generalized volume loss. No hydrocephalus.    VISUALIZED ORBITS/SINUSES/MASTOIDS: No visible intraorbital abnormality. Paranasal sinuses are clear. No middle ear or mastoid effusion.    BONES/SOFT TISSUES: No acute abnormality.      Impression    IMPRESSION:  1.  No acute transcortical infarct, intracranial hemorrhage, or mass effect.

## 2023-10-11 NOTE — PROGRESS NOTES
PRIOR TO DISCHARGE        Comments:   -diagnostic tests and consults completed and resulted MET  -vital signs normal or at patient baseline NOT MET  Nurse to notify provider when observation goals have been met and patient is ready for discharge.

## 2023-10-11 NOTE — PLAN OF CARE
PRIMARY Concern: HTN urgency  SAFETY RISK Concerns none  Tests/Procedures for NEXT shift: none  Consults?  none  Where is patient from? Home  Other Important info for NEXT shift: Pt. Would like referrals to help find a PCP  Anticipated DC date & active delays: 10/11  ____________________________________SUMMARY NOTE:                Orientation/Cognitive: A&O  Observation Goals (Met/ Not Met): Not Met  Mobility Level/Assist Equipment: Independent  Antibiotics & Plan started Norvasc  Pain Management: Tylenol,cold/hot packs for HA  Tele/VS/O2: HTN  ABNL Lab/BG: none  Diet: reg  Bowel/Bladder: cont  Skin Concerns: none  Drains/Devices: IV infusing  Patient Stated Goal for Today: get my BP in control

## 2023-10-11 NOTE — PHARMACY-ADMISSION MEDICATION HISTORY
Pharmacist Admission Medication History    Admission medication history is complete. The information provided in this note is only as accurate as the sources available at the time of the update.    Information Source(s): Patient and CareEverywhere/SureScripts via in-person    Changes made to PTA medication list:  Added: all meds  Deleted: None  Changed: None    Allergies reviewed with patient and updates made in EHR: yes    Medication History Completed By: Yesenia Yap [facility] 10/10/2023 9:41 PM    PTA Med List   Medication Sig Last Dose    CHOLECALCIFEROL PO Take 1 tablet by mouth daily     Docosahexaenoic Acid (DHA) 200 MG capsule Take 200 mg by mouth daily     fluticasone-salmeterol (ADVAIR) 100-50 MCG/ACT inhaler Inhale 1 puff into the lungs every 12 hours     Probiotic Product (PROBIOTIC DAILY PO) Take 1 capsule by mouth daily     vitamin C (ASCORBIC ACID) 1000 MG TABS Take 2,000 mg by mouth daily

## 2023-10-11 NOTE — DISCHARGE INSTRUCTIONS
Monitor and record blood pressure daily and report levels above 180 to your PMD    If persistently more than 170 systolic, can increase amlodipine to 10 mg/day until further review by your PMD

## 2023-10-12 ENCOUNTER — HOSPITAL ENCOUNTER (EMERGENCY)
Facility: CLINIC | Age: 73
Discharge: HOME | End: 2023-10-12
Attending: EMERGENCY MEDICINE | Admitting: EMERGENCY MEDICINE
Payer: COMMERCIAL

## 2023-10-12 VITALS
BODY MASS INDEX: 30.61 KG/M2 | HEART RATE: 94 BPM | DIASTOLIC BLOOD PRESSURE: 82 MMHG | TEMPERATURE: 98.5 F | HEIGHT: 67 IN | RESPIRATION RATE: 16 BRPM | OXYGEN SATURATION: 98 % | WEIGHT: 195 LBS | SYSTOLIC BLOOD PRESSURE: 173 MMHG

## 2023-10-12 DIAGNOSIS — I10 HYPERTENSION, UNSPECIFIED TYPE: ICD-10-CM

## 2023-10-12 LAB
ANION GAP SERPL CALCULATED.3IONS-SCNC: 13 MMOL/L (ref 7–15)
ATRIAL RATE - MUSE: 77 BPM
BASO+EOS+MONOS # BLD AUTO: NORMAL 10*3/UL
BASO+EOS+MONOS NFR BLD AUTO: NORMAL %
BASOPHILS # BLD AUTO: 0.1 10E3/UL (ref 0–0.2)
BASOPHILS NFR BLD AUTO: 1 %
BUN SERPL-MCNC: 12.9 MG/DL (ref 8–23)
CALCIUM SERPL-MCNC: 9.4 MG/DL (ref 8.8–10.2)
CHLORIDE SERPL-SCNC: 106 MMOL/L (ref 98–107)
CREAT SERPL-MCNC: 0.54 MG/DL (ref 0.51–0.95)
DEPRECATED HCO3 PLAS-SCNC: 20 MMOL/L (ref 22–29)
DIASTOLIC BLOOD PRESSURE - MUSE: NORMAL MMHG
EGFRCR SERPLBLD CKD-EPI 2021: >90 ML/MIN/1.73M2
EOSINOPHIL # BLD AUTO: 0.2 10E3/UL (ref 0–0.7)
EOSINOPHIL NFR BLD AUTO: 3 %
ERYTHROCYTE [DISTWIDTH] IN BLOOD BY AUTOMATED COUNT: 12.5 % (ref 10–15)
GLUCOSE SERPL-MCNC: 99 MG/DL (ref 70–99)
HCT VFR BLD AUTO: 40.6 % (ref 35–47)
HGB BLD-MCNC: 13.4 G/DL (ref 11.7–15.7)
IMM GRANULOCYTES # BLD: 0 10E3/UL
IMM GRANULOCYTES NFR BLD: 0 %
INTERPRETATION ECG - MUSE: NORMAL
LYMPHOCYTES # BLD AUTO: 2.4 10E3/UL (ref 0.8–5.3)
LYMPHOCYTES NFR BLD AUTO: 30 %
MCH RBC QN AUTO: 30.7 PG (ref 26.5–33)
MCHC RBC AUTO-ENTMCNC: 33 G/DL (ref 31.5–36.5)
MCV RBC AUTO: 93 FL (ref 78–100)
MONOCYTES # BLD AUTO: 0.4 10E3/UL (ref 0–1.3)
MONOCYTES NFR BLD AUTO: 5 %
NEUTROPHILS # BLD AUTO: 4.8 10E3/UL (ref 1.6–8.3)
NEUTROPHILS NFR BLD AUTO: 61 %
NRBC # BLD AUTO: 0 10E3/UL
NRBC BLD AUTO-RTO: 0 /100
P AXIS - MUSE: 61 DEGREES
PLATELET # BLD AUTO: 250 10E3/UL (ref 150–450)
POTASSIUM SERPL-SCNC: 3.7 MMOL/L (ref 3.4–5.3)
PR INTERVAL - MUSE: 166 MS
QRS DURATION - MUSE: 88 MS
QT - MUSE: 374 MS
QTC - MUSE: 423 MS
R AXIS - MUSE: 13 DEGREES
RBC # BLD AUTO: 4.37 10E6/UL (ref 3.8–5.2)
SODIUM SERPL-SCNC: 139 MMOL/L (ref 135–145)
SYSTOLIC BLOOD PRESSURE - MUSE: NORMAL MMHG
T AXIS - MUSE: 13 DEGREES
TROPONIN T SERPL HS-MCNC: 11 NG/L
VENTRICULAR RATE- MUSE: 77 BPM
WBC # BLD AUTO: 7.8 10E3/UL (ref 4–11)

## 2023-10-12 PROCEDURE — 80048 BASIC METABOLIC PNL TOTAL CA: CPT | Performed by: EMERGENCY MEDICINE

## 2023-10-12 PROCEDURE — 93005 ELECTROCARDIOGRAM TRACING: CPT

## 2023-10-12 PROCEDURE — 36415 COLL VENOUS BLD VENIPUNCTURE: CPT | Performed by: EMERGENCY MEDICINE

## 2023-10-12 PROCEDURE — 99284 EMERGENCY DEPT VISIT MOD MDM: CPT

## 2023-10-12 PROCEDURE — 85025 COMPLETE CBC W/AUTO DIFF WBC: CPT | Performed by: EMERGENCY MEDICINE

## 2023-10-12 PROCEDURE — 84484 ASSAY OF TROPONIN QUANT: CPT | Performed by: EMERGENCY MEDICINE

## 2023-10-12 NOTE — ED TRIAGE NOTES
Recent admission for HTN, dizziness. Today BP was 212/115, called her PCP and instructed to come to ER. States her BP was fine until she took the amlodipine this afternoon.

## 2023-10-12 NOTE — ED PROVIDER NOTES
"  History     Chief Complaint:  Hypertension       HPI   Alessandra Barajas is a 73 year old female with a history of asthma, hyperlipidemia, and breast cancer who presents to the ED for hypertension and tiredness after taking her new medication 5 mg of amlodipine at 1400 today. She took amlodipine yesterday at 2 pm as well. Her blood pressure went from 130 to 212 systolic. She was previously here 2 days ago and stayed overnight. She was discharged yesterday and felt fine until this afternoon. Denies chest pain, shortness of breath, numbness, weakness, feeling ill, sore throat, or nausea. She has only taken her inhaler, Flonase, and amlodipine recently. Current BP is 181/96.     Independent Historian:   None - Patient Only    Review of External Notes:   Discharge Summary 10/11    Medications:    Advair   Amlodipine   Augmentin   Wixela     Past Medical History:    Asthma   Colon polyp adenomatous   Osteopenia   Breast cancer  Hyperlipidemia   Migraine   Chronic constipation     Past Surgical History:    Colonosocpy     Physical Exam   Patient Vitals for the past 24 hrs:   BP Temp Temp src Pulse Resp SpO2 Height Weight   10/12/23 1824 (!) 181/96 -- -- 94 -- -- -- --   10/12/23 1733 (!) 190/91 98.5  F (36.9  C) Temporal 82 16 98 % 1.702 m (5' 7\") 88.5 kg (195 lb)        Physical Exam  Eyes:  The pupils are equal and round    Conjunctivae and sclerae are normal  ENT:    The nose is normal    Pinnae are normal  CV:  Regular rate and rhythm     No edema  Resp:  Normal respiratory effort    Speaks in full sentences  MS:  Normal muscular tone    No asymmetric leg swelling  Skin:  No rash or acute skin lesions noted  Neuro:   Awake, alert.      Speech is normal and fluent.    Face is symmetric.     Moves all extremities      Emergency Department Course   ECG  ECG results from 10/12/23   EKG 12-lead, tracing only     Value    Systolic Blood Pressure     Diastolic Blood Pressure     Ventricular Rate 77    Atrial Rate 77    MS " Interval 166    QRS Duration 88        QTc 423    P Axis 61    R AXIS 13    T Axis 13    Interpretation ECG      Sinus rhythm with marked sinus arrhythmia  Possible Left atrial enlargement  Nonspecific T wave abnormality  Abnormal ECG  When compared with ECG of 10-OCT-2023 13:37,  No significant change was found  Confirmed by GENERATED REPORT, COMPUTER ([id number]),  Zaid Kay ([id number]) on 10/12/2023 5:36:11 PM       Laboratory:  Labs Ordered and Resulted from Time of ED Arrival to Time of ED Departure   BASIC METABOLIC PANEL - Abnormal       Result Value    Sodium 139      Potassium 3.7      Chloride 106      Carbon Dioxide (CO2) 20 (*)     Anion Gap 13      Urea Nitrogen 12.9      Creatinine 0.54      GFR Estimate >90      Calcium 9.4      Glucose 99     TROPONIN T, HIGH SENSITIVITY - Normal    Troponin T, High Sensitivity 11     CBC WITH PLATELETS AND DIFFERENTIAL    WBC Count 7.8      RBC Count 4.37      Hemoglobin 13.4      Hematocrit 40.6      MCV 93      MCH 30.7      MCHC 33.0      RDW 12.5      Platelet Count 250      % Neutrophils 61      % Lymphocytes 30      % Monocytes 5      Mids % (Monos, Eos, Basos)        % Eosinophils 3      % Basophils 1      % Immature Granulocytes 0      NRBCs per 100 WBC 0      Absolute Neutrophils 4.8      Absolute Lymphocytes 2.4      Absolute Monocytes 0.4      Mids Abs (Monos, Eos, Basos)        Absolute Eosinophils 0.2      Absolute Basophils 0.1      Absolute Immature Granulocytes 0.0      Absolute NRBCs 0.0          Emergency Department Course & Assessments:    Interventions:  Medications - No data to display     Assessments:  1822 I obtained the history and examined the patient as detailed above.     Independent Interpretation (X-rays, CTs, rhythm strip):  None      Social Determinants of Health affecting care:   None    Disposition:  The patient was discharged to home.     Impression & Plan      Medical Decision Making:  Who is here with concerns about high  blood pressure.  She does have elevated blood pressure here.  She was recently admitted for blood pressure control and had improved.  Since returning home her blood pressure is increased again.  No worrisome symptoms.  EKG without changes.  Troponin and creatinine are normal.  Had a long discussion about treatment options.  We will have her continue to monitor her blood pressure at home tonight.  She can take an extra dose of her amlodipine.  We will plan for her to follow-up with her PCP to further discuss blood pressure medication management.  Patient was discharged home and encouraged return with any new or worrisome symptoms.    Diagnosis:    ICD-10-CM    1. Hypertension, unspecified type  I10            Discharge Medications:  Discharge Medication List as of 10/12/2023  8:03 PM         Scribe Disclosure:  Matti BARTON, am serving as a scribe at 6:54 PM on 10/12/2023 to document services personally performed by Michael Levy MD based on my observations and the provider's statements to me.   10/12/2023   Michael Levy MD [place], Aaron Joseph, MD  10/30/23 [id number]

## 2023-10-12 NOTE — ED NOTES
PIT/Triage Evaluation    Patient presents to the ED for hypertension and tiredness after taking her new medication 5 mg of amlodipine at 1400 today. She took amlodipine via IV yesterday at 2 pm as well. Her blood pressure went from 130 to 212 systolic. She was previously here 2 days ago and stayed overnight. She was discharged yesterday and felt fine until this afternoon. Denies chest pain, shortness of breath, numbness, weakness, feeling ill, sore throat, or nausea. She has only taken her inhaler, Flonase, and amlodipine recently. Current BP is 181/96.     Exam is notable for:  Eyes:  The pupils are equal and round    Conjunctivae and sclerae are normal  CV:  Regular rate and rhythm     No edema  Resp:  Lungs are clear    Non-labored  MS:  Normal muscular tone    No asymmetric leg swelling  Skin:  No rash or acute skin lesions noted  Neuro:   Awake, alert.      Speech is normal and fluent.    Face is symmetric.     Moves all extremities      Appropriate interventions for symptom management were initiated if applicable.  Appropriate diagnostic tests were initiated if indicated.    Important information for subsequent clinician:      I briefly evaluated the patient and developed an initial plan of care. I discussed this plan and explained that this brief interaction does not constitute a full evaluation. Patient/family understands that they should wait to be fully evaluated and discuss any test results with another clinician prior to leaving the hospital.    Scribe Disclosure:  I, Matti Oseguera, am serving as a scribe at 6:29 PM on 10/12/2023 to document services personally performed by Michael Levy MD based on my observations and the provider's statements to me.         Michael Levy MD  10/12/23 [id number]

## 2023-10-13 NOTE — DISCHARGE INSTRUCTIONS
If your blood pressure remains elevated tonight (>140/90), take a second dose of your amlodipine tonight and monitor your blood pressure tomorrow.  Tomorrow if your blood pressures have been well controlled, take 7.5 mg of amlodipine at night before bed.